# Patient Record
Sex: MALE | Race: WHITE | NOT HISPANIC OR LATINO | Employment: FULL TIME | URBAN - METROPOLITAN AREA
[De-identification: names, ages, dates, MRNs, and addresses within clinical notes are randomized per-mention and may not be internally consistent; named-entity substitution may affect disease eponyms.]

---

## 2020-04-16 ENCOUNTER — TRANSCRIBE ORDERS (OUTPATIENT)
Dept: PHYSICAL THERAPY | Facility: CLINIC | Age: 40
End: 2020-04-16

## 2020-04-16 DIAGNOSIS — M54.50 LUMBAR BACK PAIN: Primary | ICD-10-CM

## 2020-04-22 ENCOUNTER — TREATMENT (OUTPATIENT)
Dept: PHYSICAL THERAPY | Facility: CLINIC | Age: 40
End: 2020-04-22

## 2020-04-22 DIAGNOSIS — M54.50 ACUTE BILATERAL LOW BACK PAIN WITHOUT SCIATICA: ICD-10-CM

## 2020-04-22 DIAGNOSIS — S39.012D STRAIN OF LUMBAR REGION, SUBSEQUENT ENCOUNTER: Primary | ICD-10-CM

## 2020-04-22 PROCEDURE — 97014 ELECTRIC STIMULATION THERAPY: CPT | Performed by: PHYSICAL THERAPIST

## 2020-04-22 PROCEDURE — 97110 THERAPEUTIC EXERCISES: CPT | Performed by: PHYSICAL THERAPIST

## 2020-04-22 PROCEDURE — 97161 PT EVAL LOW COMPLEX 20 MIN: CPT | Performed by: PHYSICAL THERAPIST

## 2020-04-22 NOTE — PROGRESS NOTES
"Physical Therapy Initial Evaluation and Plan of Care    Patient: Nikos Cortez   : 1980  Diagnosis/ICD-10 Code:  Strain of lumbar region, subsequent encounter [S39.012D]  Referring practitioner: BOBBY Hernandez  Date of Initial Visit: 2020  Today's Date: 2020  Patient seen for 1 sessions           Subjective Questionnaire:  NATANAEL score: 18/50      Subjective Evaluation    History of Present Illness  Date of onset: 2020  Mechanism of injury: Patient reports that he injured his lower back while at work while in the tub lifting and also when lifting a erin tong on 2020.  He did finish work as the incident happened at the end of the day.  He was seen by [facility] at which time he was placed on light duty.  His return to MD is this afternoon. Levy states that the pain is less this week.  The worse position is walking and standing.  \"I have to lean forward like a hunchback old man\".  He also states that going up the steps also increases his pain;  \"I must go slow\".    TX:  Steroid pack, mm relaxer and tylenol   SLEEP: normal is supine or BSL.  LSL increases the pain and wakes him up.  He states that he has, lately, slept on the couch due to the firmness and the support for his back.    PAST MED HX:  (-) Ca, DM, heart, pacemaker, metal implants.    EX:  Prior to the injury.  Limited and not consistent.       Patient Occupation: [employer]  -     Precautions and Work Restrictions: light duty Quality of life: excellent    Pain  Current pain ratin  At best pain ratin  At worst pain ratin  Location: center to the right   Quality: pressure and sharp (States the pain last week was more of a stabbing pain beatriz when standing.  This week he states the pain is more of a pressure.  )  Relieving factors: heat (the last time using the heat was over this past weekend. )  Aggravating factors: ambulation, lifting, standing and stairs  Progression: improved    Hand dominance: " right    Diagnostic Tests  X-ray: normal    Treatments  Previous treatment: medication  Current treatment: medication  Patient Goals  Patient goal: never have this pain again            Objective       Postural Observations    Additional Postural Observation Details  Elevated right IC and right PSIS  No evidence of pain during history intake as observed of posture and facial expression.      Palpation     Additional Palpation Details  No spasm or tenderness noted of the soft tissue     Tenderness     Lumbar Spine  Tenderness in the facet joint and right transverse process.     Right Hip   Tenderness in the PSIS.     Active Range of Motion     Lumbar   Flexion: 35 degrees   Extension: 7 degrees with pain  Left lateral flexion: 10 degrees   Right lateral flexion: 10 degrees   Left rotation: 25 degrees   Right rotation: 20 degrees     Additional Active Range of Motion Details  Flexion, LSB and L rotation wo pain.    Extension: increased pain at the center and slightly right. No change with reps  RSB repeated motion relieved the pain.   L rotation with mild pain on the right.     Strength/Myotome Testing     Lumbar   Left   Normal strength    Right   Normal strength    Additional Strength Details  Core strength:  3/5 as noted with contralateral pelvis locking before lifting the LE.  Also unable to lower straight legs < 80 wo LS substitution      Tests     Lumbar   Positive repeated extension.   Negative repeated flexion.          Assessment & Plan     Assessment  Impairments: abnormal or restricted ROM, activity intolerance, impaired physical strength, lacks appropriate home exercise program and pain with function  Assessment details: Mr. Cortez is a 40 yr old male referred to PT due to a lumbar injury that occurred on 4/7/2020 while at work.  He was seen in the clinic today for the PT evaluation and treatment.  It is noted that Mr. Cortez presents with postural deficits, reduced ROM and significant core weakness.    He  would benefit from outpatient PT in order to achieve the stated goals and resume full work.    Prognosis: good  Functional Limitations: lifting, uncomfortable because of pain and standing  Goals  Plan Goals: ST visits     1)  Pain levels 0-5/10     2)  Pnt to demonstrate neural postural alignment     3)  Pnt to complete core ex wo evidence of subsitution       LTG:   DC     1) 0-2 pain level     2)  Normal and pain free lumbar motion     3)  pnt to demonstrate neutral spine posture throughout resting positions and during moderate level challenged motions.     4)  Improve NATANAEL =/> 15 points     5)  IHEP    Plan  Therapy options: will be seen for skilled physical therapy services  Planned modality interventions: cryotherapy and high voltage pulsed current (pain management)  Planned therapy interventions: abdominal trunk stabilization, body mechanics training, flexibility, functional ROM exercises, home exercise program, manual therapy, neuromuscular re-education, spinal/joint mobilization, soft tissue mobilization, strengthening, stretching and therapeutic activities  Frequency: 3x week  Duration in visits: 10  Treatment plan discussed with: patient        Timed:         Manual Therapy:    6     mins  62669;     Therapeutic Exercise:    14     mins  96792;     Neuromuscular Silvestre:        mins  79087;    Therapeutic Activity:          mins  60659;     Gait Training:           mins  15020;     Ultrasound:          mins  58762;    Ionto                                   mins   77583  Self Care                       6     mins   52845  Canalith Repos         mins 02523      Un-Timed:  Electrical Stimulation:    15     mins  27903 ( );  Dry Needling          mins self-pay  Traction          mins 36550  Low Eval          Mins  94162  Mod Eval     24     Mins  58753  High Eval                            Mins  23657  Re-Eval                               mins  06722        Timed Treatment:   26   mins   Total  Treatment:     65   mins    PT SIGNATURE: Janice Harman, JAYLENE   DATE TREATMENT INITIATED: 4/22/2020    Initial Certification  Certification Period: 7/21/2020  I certify that the therapy services are furnished while this patient is under my care.  The services outlined above are required by this patient, and will be reviewed every 90 days.     PHYSICIAN: Owen Guerrero PA      DATE:     Please sign and return via fax to  .. Thank you, [facility].

## 2020-04-27 ENCOUNTER — TREATMENT (OUTPATIENT)
Dept: PHYSICAL THERAPY | Facility: CLINIC | Age: 40
End: 2020-04-27

## 2020-04-27 DIAGNOSIS — S39.012D STRAIN OF LUMBAR REGION, SUBSEQUENT ENCOUNTER: Primary | ICD-10-CM

## 2020-04-27 DIAGNOSIS — M54.50 ACUTE BILATERAL LOW BACK PAIN WITHOUT SCIATICA: ICD-10-CM

## 2020-04-27 PROCEDURE — 97110 THERAPEUTIC EXERCISES: CPT | Performed by: PHYSICAL THERAPIST

## 2020-04-27 PROCEDURE — 97530 THERAPEUTIC ACTIVITIES: CPT | Performed by: PHYSICAL THERAPIST

## 2020-04-27 NOTE — PROGRESS NOTES
"Physical Therapy Daily Progress Note    VISIT#: 2    Subjective   Nikos Cortez reports that his back has been getting better regarding the pain level and frequency.  \"I have a really good day and then the next day the pain is back\"  Current pain is 2/10.  Highest pain level over the last several days = 5/10.   He is still on light duty.      Objective   Gait into the clinic wo evidence of pain as gait is normal sequence.  No pain noted via posture or facial grimacing with position changes from supine<>sit, supine<> SL and sit <> stand.    See Exercise, Manual, and Modality Logs for complete treatment.     Patient Education:    Assessment & Plan     Assessment  Assessment details: Levy is showing good progress with his mobility and function relating to the injury.  No evidence of pain noted with movement or positional changes.    Goals  Plan Goals:    1)  Pain levels 0-5/10     2)  Pnt to demonstrate neural postural alignment     3)  Pnt to complete core ex wo evidence of subsitution       LTG:   DC     1) 0-2 pain level     2)  Normal and pain free lumbar motion     3)  pnt to demonstrate neutral spine posture throughout resting positions and during moderate level challenged motions.     4)  Improve NATANAEL =/> 15 points     5)  IHEP          Progress per Plan of Care            Timed:         Manual Therapy:         mins  43738;     Therapeutic Exercise:    28     mins  51019;     Neuromuscular Silvestre:        mins  30790;    Therapeutic Activity:     16     mins  21995;     Gait Training:           mins  80291;     Ultrasound:          mins  37585;    Ionto                                   mins   76533  Self Care                            mins   90602  Canalith Repos                   mins  90299    Un-Timed:  Electrical Stimulation:         mins  56655 ( );  Dry Needling          mins self-pay  Traction          mins 58874  Low Eval          Mins  57721  Mod Eval          Mins  86592  High Eval                "             Mins  68331  Re-Eval                               mins  29410    Timed Treatment:   44   mins   Total Treatment:     50   mins    Janice Harman PT    Physical Therapist

## 2020-04-29 ENCOUNTER — TREATMENT (OUTPATIENT)
Dept: PHYSICAL THERAPY | Facility: CLINIC | Age: 40
End: 2020-04-29

## 2020-04-29 DIAGNOSIS — S39.012D STRAIN OF LUMBAR REGION, SUBSEQUENT ENCOUNTER: Primary | ICD-10-CM

## 2020-04-29 DIAGNOSIS — M54.50 ACUTE BILATERAL LOW BACK PAIN WITHOUT SCIATICA: ICD-10-CM

## 2020-04-29 PROCEDURE — 97110 THERAPEUTIC EXERCISES: CPT | Performed by: PHYSICAL THERAPIST

## 2020-04-29 PROCEDURE — 97140 MANUAL THERAPY 1/> REGIONS: CPT | Performed by: PHYSICAL THERAPIST

## 2020-04-29 PROCEDURE — 97530 THERAPEUTIC ACTIVITIES: CPT | Performed by: PHYSICAL THERAPIST

## 2020-04-29 NOTE — PROGRESS NOTES
Physical Therapy Daily Progress Note    VISIT#: 3    Subjective   Nikos Cortez reports that his pain level is mostly a 1-2/10 with an occasional 4/10.        Objective   ROM:  Flex = 49,  Ext = 10,  BSB = 15, L rotation = 20, R rotation = 16  Flex and right SB with mild increase of pain.  Palpation with mild point tenderness at L L5 TP and R L4L4.   (-) prone knee flexion, SLR seated and supine.    Added increase of prone ex and core ex.   See Exercise, Manual, and Modality Logs for complete treatment.     Patient Education: Review of neutral spine posture with strengthening challenges supine, prone, sidelying and including wall sits and lunges.      Assessment & Plan     Assessment  Assessment details: Today is visit 3, including the PT evaluation.  Levy presents with greater trunk motion with less pain.  He is maintaining low pain levels throughout the day.    Goals  Plan Goals: Plan Goals: ST visits     1)  Pain levels 0-5/10 (met)     2)  Pnt to demonstrate neural postural alignment (met)     3)  Pnt to complete core ex wo evidence of substitutions (met)       LTG:   DC     1) 0-2 pain level     2)  Normal and pain free lumbar motion     3)  pnt to demonstrate neutral spine posture throughout resting positions and during moderate level challenged motions.     4)  Improve NATANAEL =/> 15 points     5)  IHEP          Other as per MD.  Pnt has follow up this afternoon.            Timed:         Manual Therapy:    10     mins  85906;     Therapeutic Exercise:    28     mins  26888;     Neuromuscular Silvestre:        mins  49245;    Therapeutic Activity:      13    mins  82148;     Gait Training:           mins  01365;     Ultrasound:          mins  03772;    Ionto                                   mins   62815  Self Care                            mins   17863  Canalith Repos                   mins  27515    Un-Timed:  Electrical Stimulation:         mins  30315 ( );  Dry Needling          mins  self-pay  Traction          mins 69320  Low Eval          Mins  01233  Mod Eval          Mins  45388  High Eval                            Mins  50449  Re-Eval                               mins  62327    Timed Treatment:   53   mins   Total Treatment:     53   mins    Janice Harman PT    Physical Therapist

## 2020-08-10 PROCEDURE — 73110 X-RAY EXAM OF WRIST: CPT | Performed by: EMERGENCY MEDICINE

## 2020-08-10 PROCEDURE — 99214 OFFICE O/P EST MOD 30 MIN: CPT | Performed by: EMERGENCY MEDICINE

## 2024-02-16 ENCOUNTER — OFFICE (OUTPATIENT)
Dept: URBAN - METROPOLITAN AREA CLINIC 64 | Facility: CLINIC | Age: 44
End: 2024-02-16

## 2024-02-16 VITALS
SYSTOLIC BLOOD PRESSURE: 152 MMHG | HEIGHT: 68 IN | HEART RATE: 80 BPM | DIASTOLIC BLOOD PRESSURE: 121 MMHG | SYSTOLIC BLOOD PRESSURE: 173 MMHG | WEIGHT: 197 LBS

## 2024-02-16 DIAGNOSIS — R74.8 ABNORMAL LEVELS OF OTHER SERUM ENZYMES: ICD-10-CM

## 2024-02-16 PROCEDURE — 99204 OFFICE O/P NEW MOD 45 MIN: CPT
